# Patient Record
Sex: FEMALE | Race: WHITE | NOT HISPANIC OR LATINO | URBAN - METROPOLITAN AREA
[De-identification: names, ages, dates, MRNs, and addresses within clinical notes are randomized per-mention and may not be internally consistent; named-entity substitution may affect disease eponyms.]

---

## 2017-05-10 ENCOUNTER — EMERGENCY (EMERGENCY)
Facility: HOSPITAL | Age: 67
LOS: 1 days | Discharge: HOME | End: 2017-05-10
Attending: EMERGENCY MEDICINE
Payer: MEDICARE

## 2017-05-10 VITALS
RESPIRATION RATE: 18 BRPM | HEART RATE: 93 BPM | HEIGHT: 61 IN | SYSTOLIC BLOOD PRESSURE: 120 MMHG | DIASTOLIC BLOOD PRESSURE: 78 MMHG | TEMPERATURE: 98 F | OXYGEN SATURATION: 99 % | WEIGHT: 182.98 LBS

## 2017-05-10 VITALS
SYSTOLIC BLOOD PRESSURE: 115 MMHG | HEART RATE: 78 BPM | TEMPERATURE: 98 F | OXYGEN SATURATION: 96 % | RESPIRATION RATE: 18 BRPM | DIASTOLIC BLOOD PRESSURE: 75 MMHG

## 2017-05-10 LAB
ALBUMIN SERPL ELPH-MCNC: 3.6 G/DL — SIGNIFICANT CHANGE UP (ref 3.5–5)
ALP SERPL-CCNC: 63 U/L — SIGNIFICANT CHANGE UP (ref 40–120)
ALT FLD-CCNC: 20 U/L DA — SIGNIFICANT CHANGE UP (ref 10–60)
ANION GAP SERPL CALC-SCNC: 7 MMOL/L — SIGNIFICANT CHANGE UP (ref 5–17)
AST SERPL-CCNC: 11 U/L — SIGNIFICANT CHANGE UP (ref 10–40)
BASOPHILS # BLD AUTO: 0.1 K/UL — SIGNIFICANT CHANGE UP (ref 0–0.2)
BASOPHILS NFR BLD AUTO: 1 % — SIGNIFICANT CHANGE UP (ref 0–2)
BILIRUB SERPL-MCNC: 0.5 MG/DL — SIGNIFICANT CHANGE UP (ref 0.2–1.2)
BUN SERPL-MCNC: 22 MG/DL — HIGH (ref 7–18)
CALCIUM SERPL-MCNC: 9.6 MG/DL — SIGNIFICANT CHANGE UP (ref 8.4–10.5)
CHLORIDE SERPL-SCNC: 101 MMOL/L — SIGNIFICANT CHANGE UP (ref 96–108)
CK SERPL-CCNC: 26 U/L — SIGNIFICANT CHANGE UP (ref 21–215)
CO2 SERPL-SCNC: 30 MMOL/L — SIGNIFICANT CHANGE UP (ref 22–31)
CREAT SERPL-MCNC: 0.95 MG/DL — SIGNIFICANT CHANGE UP (ref 0.5–1.3)
D DIMER BLD IA.RAPID-MCNC: <150 NG/ML DDU — SIGNIFICANT CHANGE UP
EOSINOPHIL # BLD AUTO: 0.1 K/UL — SIGNIFICANT CHANGE UP (ref 0–0.5)
EOSINOPHIL NFR BLD AUTO: 0.5 % — SIGNIFICANT CHANGE UP (ref 0–6)
GLUCOSE SERPL-MCNC: 146 MG/DL — HIGH (ref 70–99)
HCT VFR BLD CALC: 38 % — SIGNIFICANT CHANGE UP (ref 34.5–45)
HGB BLD-MCNC: 12.6 G/DL — SIGNIFICANT CHANGE UP (ref 11.5–15.5)
LYMPHOCYTES # BLD AUTO: 26.7 % — SIGNIFICANT CHANGE UP (ref 13–44)
LYMPHOCYTES # BLD AUTO: 3.1 K/UL — SIGNIFICANT CHANGE UP (ref 1–3.3)
MCHC RBC-ENTMCNC: 27.4 PG — SIGNIFICANT CHANGE UP (ref 27–34)
MCHC RBC-ENTMCNC: 33.2 GM/DL — SIGNIFICANT CHANGE UP (ref 32–36)
MCV RBC AUTO: 82.6 FL — SIGNIFICANT CHANGE UP (ref 80–100)
MONOCYTES # BLD AUTO: 0.4 K/UL — SIGNIFICANT CHANGE UP (ref 0–0.9)
MONOCYTES NFR BLD AUTO: 3.4 % — SIGNIFICANT CHANGE UP (ref 2–14)
NEUTROPHILS # BLD AUTO: 7.9 K/UL — HIGH (ref 1.8–7.4)
NEUTROPHILS NFR BLD AUTO: 68.5 % — SIGNIFICANT CHANGE UP (ref 43–77)
PLATELET # BLD AUTO: 294 K/UL — SIGNIFICANT CHANGE UP (ref 150–400)
POTASSIUM SERPL-MCNC: 3.8 MMOL/L — SIGNIFICANT CHANGE UP (ref 3.5–5.3)
POTASSIUM SERPL-SCNC: 3.8 MMOL/L — SIGNIFICANT CHANGE UP (ref 3.5–5.3)
PROT SERPL-MCNC: 7.2 G/DL — SIGNIFICANT CHANGE UP (ref 6–8.3)
RBC # BLD: 4.6 M/UL — SIGNIFICANT CHANGE UP (ref 3.8–5.2)
RBC # FLD: 12.1 % — SIGNIFICANT CHANGE UP (ref 10.3–14.5)
SODIUM SERPL-SCNC: 138 MMOL/L — SIGNIFICANT CHANGE UP (ref 135–145)
TROPONIN I SERPL-MCNC: <0.015 NG/ML — SIGNIFICANT CHANGE UP (ref 0–0.04)
WBC # BLD: 11.6 K/UL — HIGH (ref 3.8–10.5)
WBC # FLD AUTO: 11.6 K/UL — HIGH (ref 3.8–10.5)

## 2017-05-10 PROCEDURE — 84484 ASSAY OF TROPONIN QUANT: CPT

## 2017-05-10 PROCEDURE — 99283 EMERGENCY DEPT VISIT LOW MDM: CPT | Mod: 25

## 2017-05-10 PROCEDURE — 85027 COMPLETE CBC AUTOMATED: CPT

## 2017-05-10 PROCEDURE — 93005 ELECTROCARDIOGRAM TRACING: CPT

## 2017-05-10 PROCEDURE — 85379 FIBRIN DEGRADATION QUANT: CPT

## 2017-05-10 PROCEDURE — 80053 COMPREHEN METABOLIC PANEL: CPT

## 2017-05-10 PROCEDURE — 82550 ASSAY OF CK (CPK): CPT

## 2017-05-10 PROCEDURE — 99285 EMERGENCY DEPT VISIT HI MDM: CPT

## 2017-05-10 PROCEDURE — 71046 X-RAY EXAM CHEST 2 VIEWS: CPT

## 2017-05-10 PROCEDURE — 71020: CPT | Mod: 26

## 2017-05-10 RX ORDER — DIAZEPAM 5 MG
1 TABLET ORAL
Qty: 10 | Refills: 0 | OUTPATIENT
Start: 2017-05-10

## 2017-05-10 RX ORDER — DIAZEPAM 5 MG
5 TABLET ORAL ONCE
Qty: 0 | Refills: 0 | Status: DISCONTINUED | OUTPATIENT
Start: 2017-05-10 | End: 2017-05-10

## 2017-05-10 RX ORDER — ACETAMINOPHEN 500 MG
650 TABLET ORAL ONCE
Qty: 0 | Refills: 0 | Status: COMPLETED | OUTPATIENT
Start: 2017-05-10 | End: 2017-05-10

## 2017-05-10 RX ORDER — IBUPROFEN 200 MG
1 TABLET ORAL
Qty: 30 | Refills: 0 | OUTPATIENT
Start: 2017-05-10

## 2017-05-10 RX ADMIN — Medication 5 MILLIGRAM(S): at 14:14

## 2017-05-10 RX ADMIN — Medication 650 MILLIGRAM(S): at 14:14

## 2017-05-10 NOTE — ED PROVIDER NOTE - PHYSICAL EXAMINATION
R back tenderness to palpation, R shoulder tenderness to palpation, pain with ROM on aim, R chest wall tenderness to palpation R upper back tenderness to palpation, R shoulder tenderness to palpation, pain with ROM on arm, R chest wall tenderness to palpation.

## 2017-05-10 NOTE — ED PROVIDER NOTE - MEDICAL DECISION MAKING DETAILS
Pt w/ R sided back shoulder and chest reproducible pain. Labs, and chest X-ray was normal. Will dc home with PCP follow up. Pain improved with Valium and Tylenol. Pt w/ R sided back shoulder and chest reproducible on palpation pain. Labs, and chest X-ray was normal. Will dc home with PCP follow up. Pain improved with Valium and Tylenol. pain suggestive of muscular etiology and is mostly in right upper back.

## 2017-05-10 NOTE — ED ADULT NURSE NOTE - OBJECTIVE STATEMENT
"My chest hurts when I take a deep breath and it's causing me difficulty sleeping. It started in the upper back and radiated to anterior chest and R arm. I have difficulty breathing and SOB." Denies dizziness. Hx of diabetes, HTN. Takes metformin, losartan, tradjenta

## 2017-05-10 NOTE — ED PROVIDER NOTE - OBJECTIVE STATEMENT
66 y/o F pt w/ PMHx of DVT on Xarelto, DM and HTN presents to the ED c/o several days of R shoulder and anterior chest pain. Pain is worse with movement. Pt took Tylenol with codeine yesterday to no relief. DVT was 3 years ago after injury and surgery to leg; pt was told she needs to be on blood thinners for 3 years. Denies fever, chills or any other complaints. NKDA.

## 2017-05-14 DIAGNOSIS — E11.9 TYPE 2 DIABETES MELLITUS WITHOUT COMPLICATIONS: ICD-10-CM

## 2017-05-14 DIAGNOSIS — Z86.718 PERSONAL HISTORY OF OTHER VENOUS THROMBOSIS AND EMBOLISM: ICD-10-CM

## 2017-05-14 DIAGNOSIS — Z88.0 ALLERGY STATUS TO PENICILLIN: ICD-10-CM

## 2017-05-14 DIAGNOSIS — Z79.01 LONG TERM (CURRENT) USE OF ANTICOAGULANTS: ICD-10-CM

## 2017-05-14 DIAGNOSIS — R07.9 CHEST PAIN, UNSPECIFIED: ICD-10-CM

## 2017-05-14 DIAGNOSIS — M54.9 DORSALGIA, UNSPECIFIED: ICD-10-CM

## 2017-05-14 DIAGNOSIS — I10 ESSENTIAL (PRIMARY) HYPERTENSION: ICD-10-CM

## 2019-02-20 ENCOUNTER — OUTPATIENT (OUTPATIENT)
Dept: OUTPATIENT SERVICES | Facility: HOSPITAL | Age: 69
LOS: 1 days | End: 2019-02-20
Payer: MEDICARE

## 2019-02-20 DIAGNOSIS — I10 ESSENTIAL (PRIMARY) HYPERTENSION: ICD-10-CM

## 2019-02-20 DIAGNOSIS — R10.10 UPPER ABDOMINAL PAIN, UNSPECIFIED: ICD-10-CM

## 2019-02-20 DIAGNOSIS — R07.1 CHEST PAIN ON BREATHING: ICD-10-CM

## 2019-02-20 DIAGNOSIS — F51.02 ADJUSTMENT INSOMNIA: ICD-10-CM

## 2019-02-20 DIAGNOSIS — R51 HEADACHE: ICD-10-CM

## 2019-02-20 DIAGNOSIS — E66.01 MORBID (SEVERE) OBESITY DUE TO EXCESS CALORIES: ICD-10-CM

## 2019-02-20 DIAGNOSIS — E11.40 TYPE 2 DIABETES MELLITUS WITH DIABETIC NEUROPATHY, UNSPECIFIED: ICD-10-CM

## 2019-02-20 PROCEDURE — 71046 X-RAY EXAM CHEST 2 VIEWS: CPT

## 2019-02-20 PROCEDURE — 72074 X-RAY EXAM THORAC SPINE4/>VW: CPT

## 2019-02-20 PROCEDURE — 72074 X-RAY EXAM THORAC SPINE4/>VW: CPT | Mod: 26

## 2019-02-20 PROCEDURE — 71046 X-RAY EXAM CHEST 2 VIEWS: CPT | Mod: 26

## 2019-07-16 ENCOUNTER — OUTPATIENT (OUTPATIENT)
Dept: OUTPATIENT SERVICES | Facility: HOSPITAL | Age: 69
LOS: 1 days | End: 2019-07-16
Payer: MEDICARE

## 2019-07-16 DIAGNOSIS — M25.571 PAIN IN RIGHT ANKLE AND JOINTS OF RIGHT FOOT: ICD-10-CM

## 2019-07-16 DIAGNOSIS — M25.572 PAIN IN LEFT ANKLE AND JOINTS OF LEFT FOOT: ICD-10-CM

## 2019-07-16 DIAGNOSIS — I10 ESSENTIAL (PRIMARY) HYPERTENSION: ICD-10-CM

## 2019-07-16 DIAGNOSIS — R60.0 LOCALIZED EDEMA: ICD-10-CM

## 2019-07-16 DIAGNOSIS — M25.562 PAIN IN LEFT KNEE: ICD-10-CM

## 2019-07-16 DIAGNOSIS — E78.2 MIXED HYPERLIPIDEMIA: ICD-10-CM

## 2019-07-16 DIAGNOSIS — E11.40 TYPE 2 DIABETES MELLITUS WITH DIABETIC NEUROPATHY, UNSPECIFIED: ICD-10-CM

## 2019-07-16 DIAGNOSIS — M25.561 PAIN IN RIGHT KNEE: ICD-10-CM

## 2019-07-16 DIAGNOSIS — E83.42 HYPOMAGNESEMIA: ICD-10-CM

## 2019-07-16 PROCEDURE — 73610 X-RAY EXAM OF ANKLE: CPT | Mod: 26,50

## 2019-07-16 PROCEDURE — 73610 X-RAY EXAM OF ANKLE: CPT

## 2019-07-16 PROCEDURE — 73562 X-RAY EXAM OF KNEE 3: CPT

## 2019-07-16 PROCEDURE — 73562 X-RAY EXAM OF KNEE 3: CPT | Mod: 26,50

## 2019-10-24 ENCOUNTER — OUTPATIENT (OUTPATIENT)
Dept: OUTPATIENT SERVICES | Facility: HOSPITAL | Age: 69
LOS: 1 days | End: 2019-10-24
Payer: MEDICARE

## 2019-10-24 DIAGNOSIS — M54.6 PAIN IN THORACIC SPINE: ICD-10-CM

## 2019-10-24 DIAGNOSIS — E78.2 MIXED HYPERLIPIDEMIA: ICD-10-CM

## 2019-10-24 DIAGNOSIS — R07.82 INTERCOSTAL PAIN: ICD-10-CM

## 2019-10-24 DIAGNOSIS — I10 ESSENTIAL (PRIMARY) HYPERTENSION: ICD-10-CM

## 2019-10-24 DIAGNOSIS — E11.40 TYPE 2 DIABETES MELLITUS WITH DIABETIC NEUROPATHY, UNSPECIFIED: ICD-10-CM

## 2019-10-24 PROCEDURE — 72074 X-RAY EXAM THORAC SPINE4/>VW: CPT | Mod: 26

## 2019-10-24 PROCEDURE — 72074 X-RAY EXAM THORAC SPINE4/>VW: CPT

## 2023-10-25 ENCOUNTER — INPATIENT (INPATIENT)
Facility: HOSPITAL | Age: 73
LOS: 0 days | Discharge: LEFT BEFORE DISCHARGE | End: 2023-10-26
Attending: STUDENT IN AN ORGANIZED HEALTH CARE EDUCATION/TRAINING PROGRAM | Admitting: STUDENT IN AN ORGANIZED HEALTH CARE EDUCATION/TRAINING PROGRAM
Payer: MEDICARE

## 2023-10-25 VITALS
TEMPERATURE: 98 F | HEART RATE: 79 BPM | RESPIRATION RATE: 18 BRPM | OXYGEN SATURATION: 98 % | DIASTOLIC BLOOD PRESSURE: 75 MMHG | SYSTOLIC BLOOD PRESSURE: 139 MMHG

## 2023-10-25 PROCEDURE — 99285 EMERGENCY DEPT VISIT HI MDM: CPT

## 2023-10-25 NOTE — ED ADULT TRIAGE NOTE - CHIEF COMPLAINT QUOTE
As Per Son Kobe...Puerto Rican speaking Pt ambulatory to triage. " She fell getting off of the escalator  at airport today she just flew int this happen around 10 hours ago....she is on blood thinner. She does not know why. Xerelto. She also hurt rt hand and rt leg. She did not black out.." Denies nausea, denies dizziness. DM2 + hematoma rt brow/ forehead. As Per Son Kobe...[place] citizen speaking Pt ambulatory to triage. " She fell getting off of the escalator  at airport today she just flew in today... this happen around 10 hours ago....she is on blood thinner. She does not know why. Xarelto. She also hurt rt hand and rt leg. She did not black out.." Denies nausea, denies dizziness. DM2 + hematoma rt brow/ forehead.

## 2023-10-26 VITALS — HEART RATE: 63 BPM

## 2023-10-26 DIAGNOSIS — S00.83XA CONTUSION OF OTHER PART OF HEAD, INITIAL ENCOUNTER: ICD-10-CM

## 2023-10-26 LAB
ALBUMIN SERPL ELPH-MCNC: 4.1 G/DL — SIGNIFICANT CHANGE UP (ref 3.3–5)
ALBUMIN SERPL ELPH-MCNC: 4.1 G/DL — SIGNIFICANT CHANGE UP (ref 3.3–5)
ALP SERPL-CCNC: 40 U/L — SIGNIFICANT CHANGE UP (ref 40–120)
ALP SERPL-CCNC: 40 U/L — SIGNIFICANT CHANGE UP (ref 40–120)
ALT FLD-CCNC: 15 U/L — SIGNIFICANT CHANGE UP (ref 4–33)
ALT FLD-CCNC: 15 U/L — SIGNIFICANT CHANGE UP (ref 4–33)
ANION GAP SERPL CALC-SCNC: 14 MMOL/L — SIGNIFICANT CHANGE UP (ref 7–14)
ANION GAP SERPL CALC-SCNC: 14 MMOL/L — SIGNIFICANT CHANGE UP (ref 7–14)
AST SERPL-CCNC: 17 U/L — SIGNIFICANT CHANGE UP (ref 4–32)
AST SERPL-CCNC: 17 U/L — SIGNIFICANT CHANGE UP (ref 4–32)
BASOPHILS # BLD AUTO: 0.06 K/UL — SIGNIFICANT CHANGE UP (ref 0–0.2)
BASOPHILS # BLD AUTO: 0.06 K/UL — SIGNIFICANT CHANGE UP (ref 0–0.2)
BASOPHILS NFR BLD AUTO: 0.7 % — SIGNIFICANT CHANGE UP (ref 0–2)
BASOPHILS NFR BLD AUTO: 0.7 % — SIGNIFICANT CHANGE UP (ref 0–2)
BILIRUB SERPL-MCNC: 0.4 MG/DL — SIGNIFICANT CHANGE UP (ref 0.2–1.2)
BILIRUB SERPL-MCNC: 0.4 MG/DL — SIGNIFICANT CHANGE UP (ref 0.2–1.2)
BUN SERPL-MCNC: 35 MG/DL — HIGH (ref 7–23)
BUN SERPL-MCNC: 35 MG/DL — HIGH (ref 7–23)
CALCIUM SERPL-MCNC: 9.6 MG/DL — SIGNIFICANT CHANGE UP (ref 8.4–10.5)
CALCIUM SERPL-MCNC: 9.6 MG/DL — SIGNIFICANT CHANGE UP (ref 8.4–10.5)
CHLORIDE SERPL-SCNC: 104 MMOL/L — SIGNIFICANT CHANGE UP (ref 98–107)
CHLORIDE SERPL-SCNC: 104 MMOL/L — SIGNIFICANT CHANGE UP (ref 98–107)
CO2 SERPL-SCNC: 22 MMOL/L — SIGNIFICANT CHANGE UP (ref 22–31)
CO2 SERPL-SCNC: 22 MMOL/L — SIGNIFICANT CHANGE UP (ref 22–31)
CREAT SERPL-MCNC: 1.05 MG/DL — SIGNIFICANT CHANGE UP (ref 0.5–1.3)
CREAT SERPL-MCNC: 1.05 MG/DL — SIGNIFICANT CHANGE UP (ref 0.5–1.3)
EGFR: 56 ML/MIN/1.73M2 — LOW
EGFR: 56 ML/MIN/1.73M2 — LOW
EOSINOPHIL # BLD AUTO: 0.11 K/UL — SIGNIFICANT CHANGE UP (ref 0–0.5)
EOSINOPHIL # BLD AUTO: 0.11 K/UL — SIGNIFICANT CHANGE UP (ref 0–0.5)
EOSINOPHIL NFR BLD AUTO: 1.3 % — SIGNIFICANT CHANGE UP (ref 0–6)
EOSINOPHIL NFR BLD AUTO: 1.3 % — SIGNIFICANT CHANGE UP (ref 0–6)
GLUCOSE BLDC GLUCOMTR-MCNC: 82 MG/DL — SIGNIFICANT CHANGE UP (ref 70–99)
GLUCOSE BLDC GLUCOMTR-MCNC: 82 MG/DL — SIGNIFICANT CHANGE UP (ref 70–99)
GLUCOSE SERPL-MCNC: 134 MG/DL — HIGH (ref 70–99)
GLUCOSE SERPL-MCNC: 134 MG/DL — HIGH (ref 70–99)
HCT VFR BLD CALC: 37.6 % — SIGNIFICANT CHANGE UP (ref 34.5–45)
HCT VFR BLD CALC: 37.6 % — SIGNIFICANT CHANGE UP (ref 34.5–45)
HGB BLD-MCNC: 12 G/DL — SIGNIFICANT CHANGE UP (ref 11.5–15.5)
HGB BLD-MCNC: 12 G/DL — SIGNIFICANT CHANGE UP (ref 11.5–15.5)
IANC: 4.92 K/UL — SIGNIFICANT CHANGE UP (ref 1.8–7.4)
IANC: 4.92 K/UL — SIGNIFICANT CHANGE UP (ref 1.8–7.4)
IMM GRANULOCYTES NFR BLD AUTO: 0.4 % — SIGNIFICANT CHANGE UP (ref 0–0.9)
IMM GRANULOCYTES NFR BLD AUTO: 0.4 % — SIGNIFICANT CHANGE UP (ref 0–0.9)
LYMPHOCYTES # BLD AUTO: 2.37 K/UL — SIGNIFICANT CHANGE UP (ref 1–3.3)
LYMPHOCYTES # BLD AUTO: 2.37 K/UL — SIGNIFICANT CHANGE UP (ref 1–3.3)
LYMPHOCYTES # BLD AUTO: 29 % — SIGNIFICANT CHANGE UP (ref 13–44)
LYMPHOCYTES # BLD AUTO: 29 % — SIGNIFICANT CHANGE UP (ref 13–44)
MAGNESIUM SERPL-MCNC: 1.8 MG/DL — SIGNIFICANT CHANGE UP (ref 1.6–2.6)
MAGNESIUM SERPL-MCNC: 1.8 MG/DL — SIGNIFICANT CHANGE UP (ref 1.6–2.6)
MCHC RBC-ENTMCNC: 26.5 PG — LOW (ref 27–34)
MCHC RBC-ENTMCNC: 26.5 PG — LOW (ref 27–34)
MCHC RBC-ENTMCNC: 31.9 GM/DL — LOW (ref 32–36)
MCHC RBC-ENTMCNC: 31.9 GM/DL — LOW (ref 32–36)
MCV RBC AUTO: 83.2 FL — SIGNIFICANT CHANGE UP (ref 80–100)
MCV RBC AUTO: 83.2 FL — SIGNIFICANT CHANGE UP (ref 80–100)
MONOCYTES # BLD AUTO: 0.67 K/UL — SIGNIFICANT CHANGE UP (ref 0–0.9)
MONOCYTES # BLD AUTO: 0.67 K/UL — SIGNIFICANT CHANGE UP (ref 0–0.9)
MONOCYTES NFR BLD AUTO: 8.2 % — SIGNIFICANT CHANGE UP (ref 2–14)
MONOCYTES NFR BLD AUTO: 8.2 % — SIGNIFICANT CHANGE UP (ref 2–14)
NEUTROPHILS # BLD AUTO: 4.92 K/UL — SIGNIFICANT CHANGE UP (ref 1.8–7.4)
NEUTROPHILS # BLD AUTO: 4.92 K/UL — SIGNIFICANT CHANGE UP (ref 1.8–7.4)
NEUTROPHILS NFR BLD AUTO: 60.4 % — SIGNIFICANT CHANGE UP (ref 43–77)
NEUTROPHILS NFR BLD AUTO: 60.4 % — SIGNIFICANT CHANGE UP (ref 43–77)
NRBC # BLD: 0 /100 WBCS — SIGNIFICANT CHANGE UP (ref 0–0)
NRBC # BLD: 0 /100 WBCS — SIGNIFICANT CHANGE UP (ref 0–0)
NRBC # FLD: 0 K/UL — SIGNIFICANT CHANGE UP (ref 0–0)
NRBC # FLD: 0 K/UL — SIGNIFICANT CHANGE UP (ref 0–0)
PLATELET # BLD AUTO: 232 K/UL — SIGNIFICANT CHANGE UP (ref 150–400)
PLATELET # BLD AUTO: 232 K/UL — SIGNIFICANT CHANGE UP (ref 150–400)
POTASSIUM SERPL-MCNC: 3.5 MMOL/L — SIGNIFICANT CHANGE UP (ref 3.5–5.3)
POTASSIUM SERPL-MCNC: 3.5 MMOL/L — SIGNIFICANT CHANGE UP (ref 3.5–5.3)
POTASSIUM SERPL-SCNC: 3.5 MMOL/L — SIGNIFICANT CHANGE UP (ref 3.5–5.3)
POTASSIUM SERPL-SCNC: 3.5 MMOL/L — SIGNIFICANT CHANGE UP (ref 3.5–5.3)
PROT SERPL-MCNC: 6.7 G/DL — SIGNIFICANT CHANGE UP (ref 6–8.3)
PROT SERPL-MCNC: 6.7 G/DL — SIGNIFICANT CHANGE UP (ref 6–8.3)
RBC # BLD: 4.52 M/UL — SIGNIFICANT CHANGE UP (ref 3.8–5.2)
RBC # BLD: 4.52 M/UL — SIGNIFICANT CHANGE UP (ref 3.8–5.2)
RBC # FLD: 14.9 % — HIGH (ref 10.3–14.5)
RBC # FLD: 14.9 % — HIGH (ref 10.3–14.5)
SODIUM SERPL-SCNC: 140 MMOL/L — SIGNIFICANT CHANGE UP (ref 135–145)
SODIUM SERPL-SCNC: 140 MMOL/L — SIGNIFICANT CHANGE UP (ref 135–145)
TROPONIN T, HIGH SENSITIVITY RESULT: 13 NG/L — SIGNIFICANT CHANGE UP
TROPONIN T, HIGH SENSITIVITY RESULT: 13 NG/L — SIGNIFICANT CHANGE UP
TROPONIN T, HIGH SENSITIVITY RESULT: 15 NG/L — SIGNIFICANT CHANGE UP
TROPONIN T, HIGH SENSITIVITY RESULT: 15 NG/L — SIGNIFICANT CHANGE UP
WBC # BLD: 8.16 K/UL — SIGNIFICANT CHANGE UP (ref 3.8–10.5)
WBC # BLD: 8.16 K/UL — SIGNIFICANT CHANGE UP (ref 3.8–10.5)
WBC # FLD AUTO: 8.16 K/UL — SIGNIFICANT CHANGE UP (ref 3.8–10.5)
WBC # FLD AUTO: 8.16 K/UL — SIGNIFICANT CHANGE UP (ref 3.8–10.5)

## 2023-10-26 PROCEDURE — 73130 X-RAY EXAM OF HAND: CPT | Mod: 26,RT

## 2023-10-26 PROCEDURE — 72125 CT NECK SPINE W/O DYE: CPT | Mod: 26,MA

## 2023-10-26 PROCEDURE — 70450 CT HEAD/BRAIN W/O DYE: CPT | Mod: 26,MA

## 2023-10-26 RX ORDER — ACETAMINOPHEN 500 MG
975 TABLET ORAL ONCE
Refills: 0 | Status: COMPLETED | OUTPATIENT
Start: 2023-10-26 | End: 2023-10-26

## 2023-10-26 RX ORDER — METOCLOPRAMIDE HCL 10 MG
10 TABLET ORAL ONCE
Refills: 0 | Status: COMPLETED | OUTPATIENT
Start: 2023-10-26 | End: 2023-10-26

## 2023-10-26 RX ADMIN — Medication 10 MILLIGRAM(S): at 00:31

## 2023-10-26 RX ADMIN — Medication 975 MILLIGRAM(S): at 00:31

## 2023-10-26 NOTE — ED ADULT NURSE NOTE - CHIEF COMPLAINT QUOTE
As Per Son Kobe...Burmese speaking Pt ambulatory to triage. " She fell getting off of the escalator  at airport today she just flew in today... this happen around 10 hours ago....she is on blood thinner. She does not know why. Xarelto. She also hurt rt hand and rt leg. She did not black out.." Denies nausea, denies dizziness. DM2 + hematoma rt brow/ forehead.

## 2023-10-26 NOTE — ED ADULT NURSE REASSESSMENT NOTE - NS ED NURSE REASSESS COMMENT FT1
report given to [facility] RN Kalyn. pt A&Ox4 offering no complaints at this time. respirations even and unlabored. awaiting transport to [facility] report given to [facility] RN Kalyn. pt A&Ox4 offering no complaints at this time. FS 82. pt given cup of apple juice. denies complaints at this time. respirations even and unlabored. [facility], side rails up

## 2023-10-26 NOTE — ED PROVIDER NOTE - NS ED ROS FT
Constitutional:  (-) fever, (-) chills, (-) lethargy  Eyes:  (-) eye pain (-) visual changes  ENMT: (-) nasal discharge, (-) sore throat. (-) neck pain or stiffness  Cardiac: (-) chest pain (-) palpitations  Respiratory:  (-) cough (-) respiratory distress.   GI:  (-) nausea (-) vomiting (-) diarrhea (-) abdominal pain.  :  (-) dysuria (-) frequency (-) burning.  MS:  (-) back pain (+) joint pain.  Neuro: (+) head pain (-) headache (-) numbness (-) tingling (-) focal weakness  Skin:  (-) rash  Except as documented in the HPI,  all other systems are negative

## 2023-10-26 NOTE — ED PROVIDER NOTE - CLINICAL SUMMARY MEDICAL DECISION MAKING FREE TEXT BOX
73 y F, History of hypertension, diabetes, on Xarelto for unclear indication, presenting with 1 day onset of head injury, right thumb pain, right knee pain after mechanical fall.  Patient reports that she tripped on an escalator 10 hours ago.  Denies LOC, chest pain, palpitation, shortness of breath, abdominal pain, back pain, neck pain.  Patient is accompanied by her son who helped translate for [place]uran. Vital signs within normal limits on arrival.  Fingerstick is 120.  EKG shows second-degree heart block type I with possible intermittent type II.  Physical exam patient is well-appearing, not in acute distress, right forehead hematoma noted, neck is supple with normal range of motion, normal cranial nerve exam, no focal sensory or motor deficit noted, no obvious deformities or extremity noted, moving all 4 extremities spontaneously with normal range of motion, right fifth digit tenderness noted on palpation, abdomen soft, nontender.  Will get x-rays of right hand, CT head, CT C-spine, cardiac markers, cardiology consult, reassess.

## 2023-10-26 NOTE — CHART NOTE - NSCHARTNOTEFT_GEN_A_CORE
Called by nurse to evaluate patient who wishes to leave the hospital against medical advice. Patient is A&O x3 and has full capacity to make his or her own medical decisions. Son was at bedside translating for the patient although he stated that she fully understands english. Pt was informed of their medical conditions, benefits, and alternatives to treatment as well as the risks of refusing treatment and the seriousness of leaving against medical advice such as the risks of heart attack, stroke, seizure, and even death were fully explained to the patient.  After expressing full understanding, patient then signs out against medical advice witnessed by the nurse.  Attending made aware.

## 2023-10-26 NOTE — ED ADULT NURSE NOTE - NSFALLHARMRISKINTERV_ED_ALL_ED

## 2023-10-26 NOTE — ED PROVIDER NOTE - NSICDXPASTMEDICALHX_GEN_ALL_CORE_FT
PAST MEDICAL HISTORY:  Diabetes     DM (diabetes mellitus)     DVT (deep venous thrombosis)     HTN (hypertension)     Hypertension

## 2023-10-26 NOTE — CONSULT NOTE ADULT - ASSESSMENT
74 y/o F poor historian, Venezuelan speaking Son Feliz Aden preferred to translate. ([phone])  PMHx of DVT on Xarelto?, DM and HTN.  Pt p/w head injury, right thumb pain, right knee pain after mechanical fall.  Patient reports that she tripped on an escalator 10 hours ago, denies preceding symptoms.  Admitting EKG showed new Mobitz type I second-degree AV block, no RENA/STD, previous RBBB in 2017.  Currently taking Eliquis for presumed DVT? (Son and patient unsure) and recent LHC with [facility] at [facility] 2-3 months ago.    #Mobitz type I second-degree AV block  #CAD  -EKG suggestive of wide complex Mobitz type I second-degree AV block, no RENA/STD, previous RBBB in 2017  -Patients HR currently 63 BPM, SBP >110. Hemodynamically stable at present.   -Hold AV rojelio blockers  -Check hsTrop x 2 sets to evaluate for ACS  -Check TSH and if elevated, reflex free T4 and T3, check lyme titre  -Check TTE to evaluate RV/LV function and to eveluate valvular disease  -Obtain records from cardiologist at [facility] (cardiologist name unknown       Thank you, if any questions or clinical situation changes please call [facility] #[id number].  Case discussed with on call [facility] fellow Dr. RICHAR Akins  Attending Attestation to follow

## 2023-10-26 NOTE — ED ADULT NURSE NOTE - OBJECTIVE STATEMENT
Pt received in rm#2. 73Y F Aox4, Pt received in rm#2. 73Y F Aox4, ambulatory family at bedside reports OK for translation Barbadian. pt c/o head pain s/p trip and fall in airport. hx T2DM and on xarelto. Pt arrives with hematoma to head. Denies any other weakness, n/v chest pain complaints. Placed on cardiac monitor, EKG in chart. IV placed 18G Lft AC labs sent medicated as ordered. call bell within reach pending CT and XR instructed to call for assistance as needed.

## 2023-10-26 NOTE — CONSULT NOTE ADULT - SUBJECTIVE AND OBJECTIVE BOX
HPI: 72 y/o F, Georgian speaking Son Feliz Aden preferred to translate. ([phone])  PMHx of DVT on Xarelto?, DM and HTN. Pt p/w head injury, right thumb pain, right knee pain after mechanical fall.  Patient reports that she tripped on an escalator 10 hours ago, denies preceding symptoms.  Denies LOC, chest pain, palpitation, shortness of breath, abdominal pain, back pain, neck pain.    	     Allergies  penicillin (Swelling)  penicillin (Anaphylaxis)    Intolerances      PAST MEDICAL & SURGICAL HISTORY:  Diabetes  Hypertension  HTN (hypertension)  DM (diabetes mellitus)  DVT (deep venous thrombosis)      SUBSTANCE USE  Tobacco Usage:  denies  Alcohol Usage: denies  Recreational drugs: denies      REVIEW OF SYSTEMS:  Constitutional:  (-) fever, (-) chills, (-) lethargy  Eyes:  (-) eye pain (-) visual changes  ENMT: (-) nasal discharge, (-) sore throat. (-) neck pain or stiffness  Cardiac: (-) chest pain (-) palpitations  Respiratory:  (-) cough (-) respiratory distress.   GI:  (-) nausea (-) vomiting (-) diarrhea (-) abdominal pain.  :  (-) dysuria (-) frequency (-) burning.  MS:  (-) back pain (+) joint pain.  Neuro: (+) head pain (-) headache (-) numbness (-) tingling (-) focal weakness  Skin:  (-) rash  All other review of systems is negative unless indicated above.      T(C): 36.4 (10-26-23 @ 00:52), Max: 36.5 (10-25-23 @ 23:44)  HR: 75 (10-26-23 @ 00:52) (75 - 79)  BP: 113/85 (10-26-23 @ 00:52) (113/85 - 139/75)  RR: 18 (10-26-23 @ 00:52) (18 - 18)  SpO2: 97% (10-26-23 @ 00:52) (97% - 98%)  Wt(kg): --  I&O's Summary      Physical Exam:   Gen: Patient is well-appearing, NAD, AAOx3  HEENT: R forehead heamtoma noted, EOMI, PERRLA, normal conjunctiva, tongue midline, oral mucosa moist, neck is supple with normal ROM, no midline tenderness   Lung: CTAB, no respiratory distress, no wheezes/rhonchi/rales B/L, speaking in full sentences  CV: RRR, no murmurs, rubs or gallops, distal pulses 2+ b/l  Abd: soft, NT, ND, no guarding, no rigidity, no rebound tenderness  MSK: no visible deformities, R 5th digit tenderness noted, ROM normal in UE/LE  Neuro: No focal sensory or motor deficits, normal CN exam, normal coordination   Skin: Warm, well perfused, no rash, no leg swelling  Psych: normal affect, calm      CBC Full  -  ( 26 Oct 2023 00:26 )  WBC Count : 8.16 K/uL  Hemoglobin : 12.0 g/dL  Hematocrit : 37.6 %  Platelet Count - Automated : 232 K/uL  Mean Cell Volume : 83.2 fL  Mean Cell Hemoglobin : 26.5 pg  Mean Cell Hemoglobin Concentration : 31.9 gm/dL  Auto Neutrophil # : 4.92 K/uL  Auto Lymphocyte # : 2.37 K/uL  Auto Monocyte # : 0.67 K/uL  Auto Eosinophil # : 0.11 K/uL  Auto Basophil # : 0.06 K/uL  Auto Neutrophil % : 60.4 %  Auto Lymphocyte % : 29.0 %  Auto Monocyte % : 8.2 %  Auto Eosinophil % : 1.3 %  Auto Basophil % : 0.7 %    10-26    140  |  104  |  35<H>  ----------------------------<  134<H>  3.5   |  22  |  1.05    Ca    9.6      26 Oct 2023 00:26  Mg     1.80     10-26    TPro  6.7  /  Alb  4.1  /  TBili  0.4  /  DBili  x   /  AST  17  /  ALT  15  /  AlkPhos  40  10-26    proBNP: --  Lipid Profile: --   HgA1c: --   TSH: --  CARDIAC MARKERS ( 26 Oct 2023 00:26 )  15 ng/L / x     / x     / x     / x     / x              Diagnostic testing:  cath:   Had [facility] as OSH 2-3 months ago.  has f/u in office in [facility] oct 29  echo:  --  EKG:· compared to 2017, new Mobitz type I second-degree AV block, no RENA/STD, previous RBBB in 2017

## 2023-10-26 NOTE — ED PROVIDER NOTE - ATTENDING CONTRIBUTION TO CARE
HPI: 73 y F, History of hypertension, diabetes, on Xarelto for unclear indication (pt states has been on for 8 years, unknown indication, son also does not know) presenting with 1 day onset of head injury, right thumb pain, right knee pain after mechanical fall at airport in [place] prior to her boarding a flight today that lasted approx 8 hours, son picked her up from airport then brought her straight to ED for evaluation.  Patient reports that she tripped on an escalator 10 hours ago.  head pain is frontal right forehead, no bleeding reported after fall, no associated N/V, chest pain, SOB, weakness, URI/UTI symptoms. No LOC after fall, ambulatory at scene and since. Patient is accompanied by her son who helped translate for British which she prefers over .     EXAM: Well appearing, obese, Right frontal forehead with hematoma, no open wounds, no bleeding, otherwise nontender head, CN 2-12 intact, no focal deficits, no vomiting, oropharynx normal, speaking full sentences, AOX3, chest wall stable, abd soft nontender, heart with IRReg rhythm, lungs ctab, moving all 4 ext FROM, right knee with superficial erythema but no deformities, normal DP pulses, equal.     MDM: pt with HTN, NIDDM that is also on xarelto x 8 years for unknown reason that had mech slip and fall in [place] then was put on plane by airport staff but was ambulatory since but picked up from [facility] airport by son that was then brought directly to the ED for evaluation from fall. pt only complaints of head pain right front where she has hematoma and hit her head. No abnormal findings on physical exam otherwise as she has no other MSK deformities, no chest pain, no SOB, or preceding issues leading to fall as pt states she had slip and fall. EKG taken in triage shows pt has a, compared to 2017, new Mobitz type I second-degree AV block which she was unaware of and son as well denies pt has history of heart issues, no CVA in past, no MI in past. Will obtain imaging to r/o ICH and Fx and obtain labs and likely consult cardiology although this is likely incidental finding for which pt can f/u outpt with but has no cardiologist.

## 2023-10-26 NOTE — ED PROVIDER NOTE - OBJECTIVE STATEMENT
73 y F, History of hypertension, diabetes, on Xarelto for unclear indication, presenting with 1 day onset of head injury, right thumb pain, right knee pain after mechanical fall.  Patient reports that she tripped on an escalator 10 hours ago.  Denies LOC, chest pain, palpitation, shortness of breath, abdominal pain, back pain, neck pain.  Patient is accompanied by her son who helped translate for Northern Irish.

## 2023-10-26 NOTE — ED PROVIDER NOTE - PHYSICAL EXAMINATION
Gen: Patient is well-appearing, NAD, AAOx3  HEENT: R forehead heamtoma noted, EOMI, PERRLA, normal conjunctiva, tongue midline, oral mucosa moist, neck is supple with normal ROM, no midline tenderness   Lung: CTAB, no respiratory distress, no wheezes/rhonchi/rales B/L, speaking in full sentences  CV: RRR, no murmurs, rubs or gallops, distal pulses 2+ b/l  Abd: soft, NT, ND, no guarding, no rigidity, no rebound tenderness  MSK: no visible deformities, R 5th digit tenderness noted, ROM normal in UE/LE  Neuro: No focal sensory or motor deficits, normal CN exam, normal coordination   Skin: Warm, well perfused, no rash, no leg swelling  Psych: normal affect, calm

## 2023-10-26 NOTE — ED PROVIDER NOTE - PROGRESS NOTE DETAILS
Doretha PGY3: per cardiology recommendation, patient will be admitted to telemetry and further inpatient evaluation for the heart block.

## 2024-08-05 NOTE — ED PROVIDER NOTE - CPE EDP EYES NORM
Notes have been updated.   
Patient would like to know if he can be seen for his left hip as well tomorrow. Patient is on schedule for an injection.  
normal...